# Patient Record
Sex: MALE | Race: WHITE | NOT HISPANIC OR LATINO | Employment: FULL TIME | URBAN - NONMETROPOLITAN AREA
[De-identification: names, ages, dates, MRNs, and addresses within clinical notes are randomized per-mention and may not be internally consistent; named-entity substitution may affect disease eponyms.]

---

## 2024-12-03 ENCOUNTER — OFFICE VISIT (OUTPATIENT)
Dept: URGENT CARE | Age: 24
End: 2024-12-03
Payer: COMMERCIAL

## 2024-12-03 VITALS
WEIGHT: 280 LBS | SYSTOLIC BLOOD PRESSURE: 142 MMHG | DIASTOLIC BLOOD PRESSURE: 89 MMHG | TEMPERATURE: 100.3 F | OXYGEN SATURATION: 96 % | BODY MASS INDEX: 34.82 KG/M2 | HEART RATE: 93 BPM | RESPIRATION RATE: 12 BRPM | HEIGHT: 75 IN

## 2024-12-03 DIAGNOSIS — J06.9 ACUTE UPPER RESPIRATORY INFECTION: Primary | ICD-10-CM

## 2024-12-03 DIAGNOSIS — R05.9 COUGH, UNSPECIFIED TYPE: ICD-10-CM

## 2024-12-03 DIAGNOSIS — R50.9 FEVER, UNSPECIFIED FEVER CAUSE: ICD-10-CM

## 2024-12-03 LAB
POC RAPID INFLUENZA A: NEGATIVE
POC RAPID INFLUENZA B: NEGATIVE
POC SARS-COV-2 AG BINAX: NORMAL

## 2024-12-03 PROCEDURE — 87634 RSV DNA/RNA AMP PROBE: CPT

## 2024-12-03 ASSESSMENT — ENCOUNTER SYMPTOMS
DIARRHEA: 0
DIZZINESS: 0
HEADACHES: 1
SINUS PRESSURE: 0
CHEST TIGHTNESS: 0
SEIZURES: 0
FATIGUE: 1
EYE ITCHING: 0
ABDOMINAL PAIN: 0
FLANK PAIN: 0
SHORTNESS OF BREATH: 0
EYE DISCHARGE: 0
VOMITING: 0
COUGH: 1
EYE PAIN: 0
FEVER: 1
SORE THROAT: 1
CHILLS: 0

## 2024-12-03 ASSESSMENT — VISUAL ACUITY: OU: 1

## 2024-12-03 NOTE — PATIENT INSTRUCTIONS
Discharge instructions    Please follow up with your Primary Care Physician within the next 5-7 days.    It is important to take prescriptions as prescribed and complete all antibiotics.     If your symptoms worsen you are instructed to immediately go to the emergency room for reevaluation and further assessment.    If you develop any chest pain, SOB, or difficulty breathing you are instructed to go to the emergency room for reevaluation.    All discharge instructions will be provided and explained to the patient at discharge.    If you have any questions regarding your treatment plan please call the [facility] urgent care clinic.

## 2024-12-03 NOTE — LETTER
December 3, 2024     Patient: Josue Moon   YOB: 2000   Date of Visit: 12/3/2024       To Whom It May Concern:    Josue Moon was seen in my clinic on 12/3/2024. Please excuse Josue for his absence from work on this day to make the appointment. He may return on 12/4/24.    If you have any questions or concerns, please don't hesitate to call.         Sincerely,         [facility]

## 2024-12-03 NOTE — PROGRESS NOTES
Subjective   Patient ID: Josue Moon is a 24 y.o. male. They present today with a chief complaint of Fever, Other (More tired than usual), Cough, and Headache (Symptoms for 4 days).    History of Present Illness  Patient is a 24-year-old male presenting to the clinic with complaints of head cold.  Patient states he has had intermittent fever, fatigue, cough, headache, sore throat, nasal congestion for the last 3 to 4 days.  Patient states that his daughter was recently diagnosed in the emergency room with RSV.  Patient states his symptoms started immediately after.  Patient not admitting to any chest pain or shortness of breath.  Patient not admitting to any abdominal pain, vomiting or diarrhea.  Patient states sore throat worse to swallow.  Mild in severity.  Cough nonproductive.  Patient would like evaluation and testing for RSV.  Patient also requesting a work note because he had to take yesterday and today off.  Patient states he gets pretty significant relief of symptoms with over-the-counter cold and flu medicine.      History provided by:  Patient  Fever   Associated symptoms include congestion, coughing, headaches and a sore throat. Pertinent negatives include no abdominal pain, chest pain, diarrhea, ear pain or vomiting.   Cough  Associated symptoms include a fever, headaches, postnasal drip and a sore throat. Pertinent negatives include no chest pain, chills, ear pain or shortness of breath.   Headache  Associated symptoms: congestion, cough, drainage, fatigue, fever and sore throat    Associated symptoms: no abdominal pain, no diarrhea, no dizziness, no ear pain, no eye pain, no seizures, no sinus pressure and no vomiting        Past Medical History  Allergies as of 12/03/2024    (No Known Allergies)       (Not in a hospital admission)       History reviewed. No pertinent past medical history.    History reviewed. No pertinent surgical history.     reports that he has quit smoking. His smoking use  "included cigarettes. He uses smokeless tobacco. He reports that he does not currently use alcohol. He reports that he does not use drugs.    Review of Systems  Review of Systems   Constitutional:  Positive for fatigue and fever. Negative for chills.   HENT:  Positive for congestion, postnasal drip and sore throat. Negative for ear discharge, ear pain and sinus pressure.    Eyes:  Negative for pain, discharge and itching.   Respiratory:  Positive for cough. Negative for chest tightness and shortness of breath.    Cardiovascular:  Negative for chest pain.   Gastrointestinal:  Negative for abdominal pain, diarrhea and vomiting.   Genitourinary:  Negative for flank pain.   Neurological:  Positive for headaches. Negative for dizziness and seizures.                                  Objective    Vitals:    12/03/24 0858   BP: 142/89   BP Location: Left arm   Patient Position: Sitting   BP Cuff Size: Large adult   Pulse: 93   Resp: 12   Temp: (!) 37.9 °C (100.3 °F)   TempSrc: Oral   SpO2: 96%   Weight: 127 kg (280 lb)   Height: 1.905 m (6' 3\")     No LMP for male patient.    Physical Exam  Vitals reviewed.   Constitutional:       General: He is awake. He is not in acute distress.     Appearance: Normal appearance. He is well-developed. He is not ill-appearing or toxic-appearing.   HENT:      Head: Normocephalic and atraumatic.      Right Ear: Tympanic membrane, ear canal and external ear normal.      Left Ear: Tympanic membrane, ear canal and external ear normal.      Nose: Congestion present.      Mouth/Throat:      Mouth: Mucous membranes are moist.      Pharynx: Oropharynx is clear. No oropharyngeal exudate or posterior oropharyngeal erythema.   Eyes:      General: Vision grossly intact.   Cardiovascular:      Rate and Rhythm: Normal rate and regular rhythm.      Heart sounds: Normal heart sounds, S1 normal and S2 normal. No murmur heard.     No friction rub. No gallop.   Pulmonary:      Effort: Pulmonary effort is " normal. No respiratory distress.      Breath sounds: Normal breath sounds. No stridor. No wheezing, rhonchi or rales.   Skin:     General: Skin is warm.   Neurological:      General: No focal deficit present.      Mental Status: He is alert and oriented to person, place, and time. Mental status is at baseline.      Gait: Gait is intact.   Psychiatric:         Mood and Affect: Mood normal.         Behavior: Behavior normal. Behavior is cooperative.         Procedures    Point of Care Test & Imaging Results from this visit  Results for orders placed or performed in visit on 12/03/24   POCT Covid-19 Rapid Antigen   Result Value Ref Range    POC NICOLE-COV-2 AG  Presumptive negative test for SARS-CoV-2 (no antigen detected)     Presumptive negative test for SARS-CoV-2 (no antigen detected)   POCT Influenza A/B manually resulted   Result Value Ref Range    POC Rapid Influenza A Negative Negative    POC Rapid Influenza B Negative Negative      No results found.    Diagnostic study results (if any) were reviewed by [facility].    Assessment/Plan   Allergies, medications, history, and pertinent labs/EKGs/Imaging reviewed by Ed Steward PA-C.     Medical Decision Making  Patient is a 24-year-old male presenting to the clinic with complaints of head cold.  Patient states he has had intermittent fever, fatigue, cough, headache, sore throat, nasal congestion for the last 3 to 4 days.  Patient states that his daughter was recently diagnosed in the emergency room with RSV.  Patient states his symptoms started immediately after.  Patient not admitting to any chest pain or shortness of breath.  Patient not admitting to any abdominal pain, vomiting or diarrhea.  Patient states sore throat worse to swallow.  Mild in severity.  Cough nonproductive.  Patient would like evaluation and testing for RSV.  Patient also requesting a work note because he had to take yesterday and today off.  Vital signs in the clinic are stable.   Patient states that he gets improvement in his symptoms when he takes over-the-counter cold and flu medicine.  Patient states that when it wears off his symptoms return otherwise they are well-controlled with over-the-counter medication.  Likely acute upper respiratory infection.  Pending RSV PCR.  Continue over-the-counter medication use as needed. Discharge instructions. Please follow up with your Primary Care Physician within the next 5-7 days. It is important to take prescriptions as prescribed and complete all antibiotics. If your symptoms worsen you are instructed to immediately go to the emergency room for reevaluation and further assessment. If you develop any chest pain, SOB, or difficulty breathing you are instructed to go to the emergency room for reevaluation. All discharge instructions will be provided and explained to the patient at discharge. If you have any questions regarding your treatment plan please call the [facility] urgent care clinic.     Orders and Diagnoses  Diagnoses and all orders for this visit:  Cough, unspecified type  -     POCT Influenza A/B manually resulted  Fever, unspecified fever cause  -     POCT Covid-19 Rapid Antigen      Medical Admin Record      Patient disposition: Home    Electronically signed by [facility]  9:27 AM

## 2024-12-04 LAB — RSV RNA RESP QL NAA+PROBE: DETECTED

## 2024-12-11 ENCOUNTER — HOSPITAL ENCOUNTER (EMERGENCY)
Facility: HOSPITAL | Age: 24
Discharge: HOME | End: 2024-12-11
Payer: COMMERCIAL

## 2024-12-11 VITALS
TEMPERATURE: 97.5 F | RESPIRATION RATE: 18 BRPM | WEIGHT: 280 LBS | HEIGHT: 75 IN | DIASTOLIC BLOOD PRESSURE: 85 MMHG | BODY MASS INDEX: 34.82 KG/M2 | OXYGEN SATURATION: 98 % | SYSTOLIC BLOOD PRESSURE: 132 MMHG | HEART RATE: 76 BPM

## 2024-12-11 DIAGNOSIS — T70.0XXA OTITIC BAROTRAUMA, INITIAL ENCOUNTER: ICD-10-CM

## 2024-12-11 DIAGNOSIS — H60.501 ACUTE OTITIS EXTERNA OF RIGHT EAR, UNSPECIFIED TYPE: Primary | ICD-10-CM

## 2024-12-11 PROCEDURE — 99283 EMERGENCY DEPT VISIT LOW MDM: CPT | Performed by: PHYSICIAN ASSISTANT

## 2024-12-11 PROCEDURE — 99284 EMERGENCY DEPT VISIT MOD MDM: CPT

## 2024-12-11 RX ORDER — NEOMYCIN SULFATE, POLYMYXIN B SULFATE, BACITRACIN ZINC, HYDROCORTISONE 3.5; 10000; 400; 1 MG/G; [USP'U]/G; [USP'U]/G; MG/G
1 OINTMENT OPHTHALMIC 2 TIMES DAILY
Qty: 3.5 G | Refills: 0 | Status: SHIPPED | OUTPATIENT
Start: 2024-12-11 | End: 2024-12-11 | Stop reason: ENTERED-IN-ERROR

## 2024-12-11 RX ORDER — NEOMYCIN SULFATE, POLYMYXIN B SULFATE AND HYDROCORTISONE 10; 3.5; 1 MG/ML; MG/ML; [USP'U]/ML
4 SUSPENSION/ DROPS AURICULAR (OTIC) 3 TIMES DAILY
Qty: 10 ML | Refills: 0 | Status: SHIPPED | OUTPATIENT
Start: 2024-12-11 | End: 2024-12-21

## 2024-12-11 ASSESSMENT — COLUMBIA-SUICIDE SEVERITY RATING SCALE - C-SSRS
2. HAVE YOU ACTUALLY HAD ANY THOUGHTS OF KILLING YOURSELF?: NO
1. IN THE PAST MONTH, HAVE YOU WISHED YOU WERE DEAD OR WISHED YOU COULD GO TO SLEEP AND NOT WAKE UP?: NO
6. HAVE YOU EVER DONE ANYTHING, STARTED TO DO ANYTHING, OR PREPARED TO DO ANYTHING TO END YOUR LIFE?: NO

## 2024-12-11 ASSESSMENT — PAIN - FUNCTIONAL ASSESSMENT: PAIN_FUNCTIONAL_ASSESSMENT: 0-10

## 2024-12-11 ASSESSMENT — PAIN DESCRIPTION - LOCATION: LOCATION: EAR

## 2024-12-11 ASSESSMENT — PAIN DESCRIPTION - ORIENTATION: ORIENTATION: RIGHT

## 2024-12-11 ASSESSMENT — PAIN DESCRIPTION - PAIN TYPE: TYPE: ACUTE PAIN

## 2024-12-11 ASSESSMENT — PAIN SCALES - GENERAL: PAINLEVEL_OUTOF10: 8

## 2024-12-12 NOTE — ED PROVIDER NOTES
"HPI   Chief Complaint   Patient presents with   • Earache       24-year-old male here with complaints of right ear pain patient states symptoms started earlier today he did try swimmers eardrops and states he had instantaneous burning sensation to the ear and worsening pain    Denies any cough congestion fevers or chills  Does have a history of \"earwax problems in the past\"                Patient History   History reviewed. No pertinent past medical history.  History reviewed. No pertinent surgical history.  No family history on file.  Social History     Tobacco Use   • Smoking status: Former     Types: Cigarettes   • Smokeless tobacco: Current   Substance Use Topics   • Alcohol use: Not Currently   • Drug use: Never       Physical Exam   ED Triage Vitals [12/11/24 1934]   Temperature Heart Rate Respirations BP   36.4 °C (97.5 °F) 82 18 137/87      Pulse Ox Temp Source Heart Rate Source Patient Position   97 % Temporal Monitor --      BP Location FiO2 (%)     -- --       Physical Exam  Vitals and nursing note reviewed.   Constitutional:       Appearance: Normal appearance.   HENT:      Head: Normocephalic and atraumatic.      Right Ear: Tympanic membrane normal.      Left Ear: Tympanic membrane, ear canal and external ear normal.      Ears:      Comments: Right ear canal slight erythema with swelling  Slight amount of earwax but the tympanic membrane is not occluded  No active drainage or discharge     Nose: Nose normal.      Mouth/Throat:      Mouth: Mucous membranes are moist.      Pharynx: Oropharynx is clear.   Cardiovascular:      Rate and Rhythm: Normal rate and regular rhythm.   Pulmonary:      Effort: Pulmonary effort is normal.      Breath sounds: Normal breath sounds.   Abdominal:      Palpations: Abdomen is soft.   Musculoskeletal:         General: Normal range of motion.      Cervical back: Normal range of motion and neck supple.   Skin:     General: Skin is warm.      Capillary Refill: Capillary refill " takes less than 2 seconds.   Neurological:      General: No focal deficit present.      Mental Status: He is alert.           ED Course & MDM   Diagnoses as of 12/11/24 2010   Acute otitis externa of right ear, unspecified type   Otitic barotrauma, initial encounter                 No data recorded     Sandra Coma Scale Score: 15 (12/11/24 1938 : Bernadine Kay RN)                           Medical Decision Making  Differential:   1) right otitis externa   2) right otitis media     24-year-old here with complaints of right ear pain started earlier today tried swimmers eardrops over-the-counter and had increased burning and discomfort on exam there was slight cerumen noted in the ear canal but the tympanic membrane was not occluded no erythema or bulging noted no foreign body noted, will treat as an otitis externa but also given medicine to help if there is a little barotitis with it stressed to the patient to take the medication as prescribed he can also take over-the-counter Motrin or Tylenol in addition if needed   Plan: Discussed differential with the patient and /or parents family   Patient to  follow up with the PCP in the next 2-3 days  Return for any worsening symptoms or go to the ER for further evaluation. Patient/family/caregiver  understands return   precautions and discharge instructions and is agreeable to the current plan   Impression:right otitis externia'          Orders and Diagnoses for this visit        Procedure  Procedures     Betsy Perrin PA-C  12/11/24 2010

## 2024-12-12 NOTE — ED TRIAGE NOTES
Pt arrives ambulatory to [facility], presenting with a right sided earache that began yesterday. Pt states he is now having issues hearing and it is causing mild headaches. Pt denies any CP, SOB, N/V/D, fever and/or chills. Pt A&Ox3, resp easy and unlabored, skin of appropriate color.

## 2024-12-14 ENCOUNTER — OFFICE VISIT (OUTPATIENT)
Dept: URGENT CARE | Age: 24
End: 2024-12-14
Payer: COMMERCIAL

## 2024-12-14 VITALS
TEMPERATURE: 100 F | BODY MASS INDEX: 36.95 KG/M2 | RESPIRATION RATE: 20 BRPM | DIASTOLIC BLOOD PRESSURE: 88 MMHG | HEART RATE: 100 BPM | HEIGHT: 75 IN | OXYGEN SATURATION: 96 % | SYSTOLIC BLOOD PRESSURE: 138 MMHG | WEIGHT: 297.18 LBS

## 2024-12-14 DIAGNOSIS — H69.93 EUSTACHIAN TUBE DYSFUNCTION, BILATERAL: ICD-10-CM

## 2024-12-14 DIAGNOSIS — H66.93 ACUTE OTITIS MEDIA, BILATERAL: ICD-10-CM

## 2024-12-14 DIAGNOSIS — H61.21 IMPACTED CERUMEN OF RIGHT EAR: Primary | ICD-10-CM

## 2024-12-14 DIAGNOSIS — H60.8X3 OTHER OTITIS EXTERNA, BILATERAL: ICD-10-CM

## 2024-12-14 RX ORDER — OFLOXACIN 3 MG/ML
10 SOLUTION AURICULAR (OTIC) DAILY
Qty: 10 ML | Refills: 0 | Status: SHIPPED | OUTPATIENT
Start: 2024-12-14 | End: 2024-12-24

## 2024-12-14 RX ORDER — AMOXICILLIN 875 MG/1
875 TABLET, FILM COATED ORAL 2 TIMES DAILY
Qty: 20 TABLET | Refills: 0 | Status: SHIPPED | OUTPATIENT
Start: 2024-12-14 | End: 2024-12-24

## 2024-12-14 RX ORDER — FLUTICASONE PROPIONATE 50 MCG
1 SPRAY, SUSPENSION (ML) NASAL DAILY
Qty: 16 G | Refills: 0 | Status: SHIPPED | OUTPATIENT
Start: 2024-12-14 | End: 2024-12-28

## 2024-12-14 NOTE — PROGRESS NOTES
"Subjective   Patient ID: Josue Moon is a 24 y.o. male. They present today with a chief complaint of Earache (BILATERAL EAR PAIN, worsening//ER visit on 12/11, states RIGHT ear was inflamed. Prescribed neomycin.  ) and Fever.    History of Present Illness  24-year-old male presents urgent care for complaint of bilateral ear pain worsening since his ER visit on 12/11/2024.  States he was told he had external ear infection and was prescribed neomycin drops.  States he also has fever.  Denies any current nausea or vomiting, chills, sweats, chest pain, shortness of breath, abdominal pain, hearing changes.  Did have cerumen impaction right ear improves some of the pain but still has pain.  Bilateral TMs are erythematous, bulging with fluid.  Ear canals mildly erythematous as well as mildly edematous with very minimal amount of mucopurulent drainage.  No tenderness to the external ear.  Bilateral mastoid processes unremarkable.  Denies any known drug allergies.  Instructed to discontinue neomycin drops.  Prescribed ofloxacin, Flonase, and amoxicillin.  Placed ENT referral to call today to schedule next available appointment.  Return/ER precautions.  Patient agrees with plan.          Past Medical History  Allergies as of 12/14/2024    (No Known Allergies)       (Not in a hospital admission)       No past medical history on file.    No past surgical history on file.     reports that he has quit smoking. His smoking use included cigarettes. He uses smokeless tobacco. He reports that he does not currently use alcohol. He reports that he does not use drugs.    Review of Systems  Review of Systems   All other systems reviewed and are negative.                                 Objective    Vitals:    12/14/24 0904   BP: 138/88   Pulse: (!) 117   Resp: 20   Temp: 37.8 °C (100 °F)   TempSrc: Oral   SpO2: 96%   Weight: 135 kg (297 lb 2.9 oz)   Height: 1.905 m (6' 3\")     No LMP for male patient.    Physical Exam  Vitals " reviewed.   Constitutional:       General: He is not in acute distress.     Appearance: Normal appearance. He is not ill-appearing, toxic-appearing or diaphoretic.   HENT:      Head: Normocephalic and atraumatic.      Ears:      Comments: Right ear canal had cerumen impaction.  After cerumen removal the ear canal is mildly erythematous/edematous/very minimal amount of mucopurulent discharge.  Left ear canal also is mildly erythematous/edematous/very minimal amount of mucopurulent discharge.  Bilateral TMs are erythematous, bulging with fluid.  Bilateral mastoid processes unremarkable.  External ear appears normal and is nontender to palpation.  There is some tenderness with traction to the tragus.     Nose: Nose normal.      Mouth/Throat:      Mouth: Mucous membranes are moist.      Pharynx: Oropharynx is clear.   Cardiovascular:      Rate and Rhythm: Normal rate and regular rhythm.   Pulmonary:      Effort: Pulmonary effort is normal.      Breath sounds: Normal breath sounds.   Abdominal:      General: Abdomen is flat.      Palpations: Abdomen is soft.      Tenderness: There is no abdominal tenderness. There is no right CVA tenderness or left CVA tenderness.   Musculoskeletal:      Cervical back: Normal range of motion and neck supple. No rigidity or tenderness.   Lymphadenopathy:      Cervical: No cervical adenopathy.   Skin:     General: Skin is warm and dry.   Neurological:      General: No focal deficit present.      Mental Status: He is alert and oriented to person, place, and time.   Psychiatric:         Mood and Affect: Mood normal.         Behavior: Behavior normal.         Procedures    Point of Care Test & Imaging Results from this visit  No results found for this visit on 12/14/24.   No results found.    Diagnostic study results (if any) were reviewed by Mary Boogie PA-C.    Assessment/Plan   Allergies, medications, history, and pertinent labs/EKGs/Imaging reviewed by Mary Boogie PA-C.      Medical Decision Making  24-year-old male presents urgent care for complaint of bilateral ear pain worsening since his ER visit on 12/11/2024.  States he was told he had external ear infection and was prescribed neomycin drops.  States he also has fever.  Denies any current nausea or vomiting, chills, sweats, chest pain, shortness of breath, abdominal pain, hearing changes.  Did have cerumen impaction right ear improves some of the pain but still has pain.  Bilateral TMs are erythematous, bulging with fluid.  Ear canals mildly erythematous as well as mildly edematous with very minimal amount of mucopurulent drainage.  No tenderness to the external ear.  Bilateral mastoid processes unremarkable.  Denies any known drug allergies.  Instructed to discontinue neomycin drops.  Prescribed ofloxacin, Flonase, and amoxicillin.  Placed ENT referral to call today to schedule next available appointment.  Return/ER precautions.  Patient agrees with plan.    Orders and Diagnoses  There are no diagnoses linked to this encounter.    Medical Admin Record      Patient disposition: Home    Electronically signed by Mary Boogie PA-C  9:19 AM

## 2024-12-14 NOTE — PATIENT INSTRUCTIONS
Please take medications as prescribed.  Discontinue neomycin drops.  Maintain adequate hydration and nutrition.  Call referral number today to schedule next available ENT appointment for follow-up.  If symptoms worsen, do not improve, or you develop any new concerning or worrisome symptoms please go to the emergency department immediately.

## 2025-02-04 ENCOUNTER — APPOINTMENT (OUTPATIENT)
Dept: AUDIOLOGY | Facility: CLINIC | Age: 25
End: 2025-02-04
Payer: COMMERCIAL

## 2025-02-07 ENCOUNTER — APPOINTMENT (OUTPATIENT)
Dept: OTOLARYNGOLOGY | Facility: CLINIC | Age: 25
End: 2025-02-07
Payer: COMMERCIAL

## 2025-02-07 DIAGNOSIS — H61.21 IMPACTED CERUMEN OF RIGHT EAR: ICD-10-CM

## 2025-02-07 DIAGNOSIS — H69.93 EUSTACHIAN TUBE DYSFUNCTION, BILATERAL: ICD-10-CM

## 2025-02-07 DIAGNOSIS — H66.93 ACUTE OTITIS MEDIA, BILATERAL: ICD-10-CM

## 2025-02-07 DIAGNOSIS — H91.93 DECREASED HEARING OF BOTH EARS: Primary | ICD-10-CM

## 2025-02-07 DIAGNOSIS — H60.8X3 OTHER OTITIS EXTERNA, BILATERAL: ICD-10-CM

## 2025-02-07 PROCEDURE — 99203 OFFICE O/P NEW LOW 30 MIN: CPT | Performed by: OTOLARYNGOLOGY

## 2025-02-07 NOTE — PROGRESS NOTES
"History Of Present Illness  Josue Moon is a 24 y.o. male presenting with: \"Double ear infection in December 2024, despite 2 months he still has symptoms\". Difficulty with hearing, ear fullness and intermittent popping in ears.    He is kindly referred by Mary Boogie PA-C    On examination, TMs look intact bilaterally. I have not seen infection, effusion or retraction. There is increased vascularity of left malleus.    Nasal passages look open bilaterally  No visible postnasal discharge.  Tonsils look hypertrophic bilaterally.  No palpable neck mass  512 cps verduzco (right), rinne +/+ bilaterally.    Plan:  1- hearing test, follow up after the test     Past Medical History  He has no past medical history on file.    Surgical History  He has no past surgical history on file.     Social History  He reports that he has quit smoking. His smoking use included cigarettes. He uses smokeless tobacco. He reports that he does not currently use alcohol. He reports that he does not use drugs.    Family History  No family history on file.     Allergies  Patient has no known allergies.    Review of Systems   Difficulty hearing  Ears feel full     Physical Exam    General appearance: Healthy-appearing, well-nourished, well groomed, in no acute distress.     Head and Face: Atraumatic with no masses, lesions, or scarring.      Salivary glands: No tenderness of the parotid glands or parotid masses.     No tenderness of the submandibular glands or submandibular masses.      Facial strength: Normal strength and symmetry, no synkinesis or facial tic.     Eyes: Conjunctivas look non-hyperemic bilaterally    Ears: Bilaterally ear canals look normal. Tympanic membranes look intact, no hyperemia, fluid or retraction. Hearing grossly normal. Mild old effusion scar (+) at left TM.    Nose: Mucosa looks normal. Focal dryness over nasal septum mucosa anteroinferiorly on both sides. Septum essentially straight.     Oral " "Cavity/Mouth: Lips and tongue look normal.     Throat: No postnasal discharge. No tonsil hypertrophy. No hyperemia.    Neck: Symmetrical, trachea midline.     Pulmonary: Normal respiratory effort.     Lymphatic: No palpable pathologic lymph nodes at neck.     Neurological/Psychiatric Orientation to person, place, and time: Normal.     Mood and affect: Normal.      Extremities: No clubbing.     Skin: No significant skin lesions were noted at face or neck        Procedure         Last Recorded Vitals  There were no vitals taken for this visit.    Relevant Results    Assessment and Plan:  Josue Moon is a 24 y.o. male presenting with: \"Double ear infection in December 2024, despite 2 months he still has symptoms\". Difficulty with hearing, ear fullness and intermittent popping in ears.    He is kindly referred by Mary Boogie PA-C    On examination, TMs look intact bilaterally. I have not seen infection, effusion or retraction. There is increased vascularity of left malleus.    Nasal passages look open bilaterally  No visible postnasal discharge.  Tonsils look hypertrophic bilaterally.  No palpable neck mass  512 cps verduzco (right), rinne +/+ bilaterally.    My initial impression, patient may have residual eustachian tube dysfunction causing fulness in ears, popping and decrease in hearing.    Plan:  1- hearing test, follow up after the test    Ayana Still  Otolaryngology - Head & Neck Surgery  "

## 2025-02-07 NOTE — LETTER
"February 7, 2025     Mary Boogie PA-C  [address] [facility] [address]    Patient: Josue Moon   YOB: 2000   Date of Visit: 2/7/2025       Dear Dr. Mary Boogie PA-C:    Thank you for referring Josue Moon to me for evaluation. Below are my notes for this consultation.  If you have questions, please do not hesitate to call me. I look forward to following your patient along with you.       Sincerely,     Ayana Still MD      CC: No Recipients  ______________________________________________________________________________________    History Of Present Illness  oJsue Moon is a 24 y.o. male presenting with: \"Double ear infection in December 2024, despite 2 months he still has symptoms\". Difficulty with hearing, ear fullness and intermittent popping in ears.    He is kindly referred by Mary Boogie PA-C    On examination, TMs look intact bilaterally. I have not seen infection, effusion or retraction. There is increased vascularity of left malleus.    Nasal passages look open bilaterally  No visible postnasal discharge.  Tonsils look hypertrophic bilaterally.  No palpable neck mass  512 cps verduzco (right), rinne +/+ bilaterally.    Plan:  1- hearing test, follow up after the test     Past Medical History  He has no past medical history on file.    Surgical History  He has no past surgical history on file.     Social History  He reports that he has quit smoking. His smoking use included cigarettes. He uses smokeless tobacco. He reports that he does not currently use alcohol. He reports that he does not use drugs.    Family History  No family history on file.     Allergies  Patient has no known allergies.    Review of Systems   Difficulty hearing  Ears feel full     Physical Exam    General appearance: Healthy-appearing, well-nourished, well groomed, in no acute distress.     Head and Face: Atraumatic with no masses, lesions, or scarring.      Salivary glands: No " "tenderness of the parotid glands or parotid masses.     No tenderness of the submandibular glands or submandibular masses.      Facial strength: Normal strength and symmetry, no synkinesis or facial tic.     Eyes: Conjunctivas look non-hyperemic bilaterally    Ears: Bilaterally ear canals look normal. Tympanic membranes look intact, no hyperemia, fluid or retraction. Hearing grossly normal. Mild old effusion scar (+) at left TM.    Nose: Mucosa looks normal. Focal dryness over nasal septum mucosa anteroinferiorly on both sides. Septum essentially straight.     Oral Cavity/Mouth: Lips and tongue look normal.     Throat: No postnasal discharge. No tonsil hypertrophy. No hyperemia.    Neck: Symmetrical, trachea midline.     Pulmonary: Normal respiratory effort.     Lymphatic: No palpable pathologic lymph nodes at neck.     Neurological/Psychiatric Orientation to person, place, and time: Normal.     Mood and affect: Normal.      Extremities: No clubbing.     Skin: No significant skin lesions were noted at face or neck        Procedure         Last Recorded Vitals  There were no vitals taken for this visit.    Relevant Results    Assessment and Plan:  Josue Moon is a 24 y.o. male presenting with: \"Double ear infection in December 2024, despite 2 months he still has symptoms\". Difficulty with hearing, ear fullness and intermittent popping in ears.    He is kindly referred by Mary Boogie PA-C    On examination, TMs look intact bilaterally. I have not seen infection, effusion or retraction. There is increased vascularity of left malleus.    Nasal passages look open bilaterally  No visible postnasal discharge.  Tonsils look hypertrophic bilaterally.  No palpable neck mass  512 cps verduzco (right), rinne +/+ bilaterally.    My initial impression, patient may have residual eustachian tube dysfunction causing fulness in ears, popping and decrease in hearing.    Plan:  1- hearing test, follow up after the " test    Ayana Still  Otolaryngology - Head & Neck Surgery

## 2025-02-26 ENCOUNTER — APPOINTMENT (OUTPATIENT)
Dept: PRIMARY CARE | Facility: CLINIC | Age: 25
End: 2025-02-26
Payer: COMMERCIAL

## 2025-02-26 VITALS
TEMPERATURE: 98 F | HEART RATE: 79 BPM | SYSTOLIC BLOOD PRESSURE: 128 MMHG | WEIGHT: 308.6 LBS | BODY MASS INDEX: 38.37 KG/M2 | HEIGHT: 75 IN | OXYGEN SATURATION: 98 % | DIASTOLIC BLOOD PRESSURE: 78 MMHG

## 2025-02-26 DIAGNOSIS — E78.5 BORDERLINE HYPERLIPIDEMIA: Primary | ICD-10-CM

## 2025-02-26 DIAGNOSIS — H92.02 ACUTE OTALGIA, LEFT: ICD-10-CM

## 2025-02-26 PROBLEM — H61.20 IMPACTED CERUMEN: Status: ACTIVE | Noted: 2025-02-26

## 2025-02-26 PROBLEM — J30.1 CHRONIC SEASONAL ALLERGIC RHINITIS DUE TO POLLEN: Status: ACTIVE | Noted: 2017-08-02

## 2025-02-26 PROBLEM — M24.811 INTERNAL DERANGEMENT OF RIGHT SHOULDER: Status: ACTIVE | Noted: 2019-06-19

## 2025-02-26 PROCEDURE — 3008F BODY MASS INDEX DOCD: CPT | Performed by: PHYSICIAN ASSISTANT

## 2025-02-26 PROCEDURE — 99203 OFFICE O/P NEW LOW 30 MIN: CPT | Performed by: PHYSICIAN ASSISTANT

## 2025-02-26 ASSESSMENT — PATIENT HEALTH QUESTIONNAIRE - PHQ9
1. LITTLE INTEREST OR PLEASURE IN DOING THINGS: NOT AT ALL
SUM OF ALL RESPONSES TO PHQ9 QUESTIONS 1 AND 2: 0
2. FEELING DOWN, DEPRESSED OR HOPELESS: NOT AT ALL

## 2025-02-26 NOTE — PROGRESS NOTES
"Subjective     HPI   Josue Moon is a 24 y.o. year old male patient with presenting to clinic with concern for   Chief Complaint   Patient presents with    New Patient Visit       Josue presents to clinic to establish care as a new patient. Recurrent earaches.    BP Readings from Last 5 Encounters:   25 128/78   24 138/88   24 132/85   24 142/89       Ear pain, scarring- hx recurrent AOM as a child  Dr Still ENT      Patient Active Problem List   Diagnosis    Chronic seasonal allergic rhinitis due to pollen    Impacted cerumen    Internal derangement of right shoulder       Past Medical History:   Diagnosis Date    Ear problems Ear Infection 12/10/24      Past Surgical History:   Procedure Laterality Date    NO PAST SURGERIES        Family History   Problem Relation Name Age of Onset    Sudden death Paternal Grandfather           in sleep ~ age 50s      Social History     Tobacco Use    Smoking status: Former     Current packs/day: 0.00     Average packs/day: 0.5 packs/day for 1 year (0.5 ttl pk-yrs)     Types: Cigarettes     Start date:      Quit date:      Years since quittin.1    Smokeless tobacco: Current   Substance Use Topics    Alcohol use: Not Currently      No current outpatient medications on file.     Review of Systems  Constitutional: Denies fever  HEENT: Denies ST, earache  CVS: Denies Chest pain  Pulmonary: Denies wheezing, SOB  GI: Denies N/V  : Denies dysuria  Musculoskeletal:  Denies myalgia  Neuro: Denies focal weakness or numbness.  Skin: Denies Rashes.  *Review of Systems is negative unless otherwise mentioned in HPI or ROS above.    Objective   /78   Pulse 79   Temp 36.7 °C (98 °F)   Ht 1.905 m (6' 3\")   Wt 140 kg (308 lb 9.6 oz)   SpO2 98%   BMI 38.57 kg/m²  reviewed Body mass index is 38.57 kg/m².     Physical Exam  Constitutional: NAD.  Resting comfortably.  Head: Atraumatic, normocephalic.  ENT: Moist oral mucosa. Nasal mucosa " wnl.   Cardiac: Regular rate & rhythm.   Pulmonary: Lungs clear bilat  GI: Soft, Nontender, nondistended.   Musculoskeletal: No peripheral edema.   Skin: No evidence of trauma. No rashes  Psych: Intact judgement and insight.    .Assessment/Plan   Problem List Items Addressed This Visit    None  Visit Diagnoses         Codes    Borderline hyperlipidemia    -  Primary E78.5    Relevant Orders    CBC    Comprehensive Metabolic Panel    TSH with reflex to Free T4 if abnormal    Lipid Panel

## 2025-03-18 ENCOUNTER — APPOINTMENT (OUTPATIENT)
Dept: OTOLARYNGOLOGY | Facility: CLINIC | Age: 25
End: 2025-03-18
Payer: COMMERCIAL

## 2025-03-18 ENCOUNTER — APPOINTMENT (OUTPATIENT)
Dept: AUDIOLOGY | Facility: CLINIC | Age: 25
End: 2025-03-18
Payer: COMMERCIAL

## 2025-03-18 DIAGNOSIS — H69.92 DISORDER OF LEFT EUSTACHIAN TUBE: Primary | ICD-10-CM

## 2025-03-18 DIAGNOSIS — H90.72 MIXED CONDUCTIVE AND SENSORINEURAL HEARING LOSS OF LEFT EAR WITH UNRESTRICTED HEARING OF RIGHT EAR: ICD-10-CM

## 2025-03-18 DIAGNOSIS — H74.8X2 STIFF MIDDLE EAR TRANSMISSION OF LEFT EAR, TYPE A-S: Primary | ICD-10-CM

## 2025-03-18 DIAGNOSIS — H93.13 SUBJECTIVE TINNITUS OF BOTH EARS: ICD-10-CM

## 2025-03-18 PROCEDURE — 92557 COMPREHENSIVE HEARING TEST: CPT | Performed by: AUDIOLOGIST

## 2025-03-18 PROCEDURE — 99213 OFFICE O/P EST LOW 20 MIN: CPT | Performed by: OTOLARYNGOLOGY

## 2025-03-18 PROCEDURE — 92550 TYMPANOMETRY & REFLEX THRESH: CPT | Performed by: AUDIOLOGIST

## 2025-03-18 RX ORDER — PREDNISONE 10 MG/1
TABLET ORAL
Qty: 34 TABLET | Refills: 0 | Status: SHIPPED | OUTPATIENT
Start: 2025-03-18

## 2025-03-18 ASSESSMENT — PAIN SCALES - GENERAL: PAINLEVEL_OUTOF10: 0 - NO PAIN

## 2025-03-18 ASSESSMENT — PAIN - FUNCTIONAL ASSESSMENT: PAIN_FUNCTIONAL_ASSESSMENT: 0-10

## 2025-03-18 NOTE — PROGRESS NOTES
Josue Moon, age 24 years, is here today for a hearing evaluation. He reports a double ear infection several months ago and the left has not resolved.    Difficulty hearing - yes, left ear for the past several months  Tinnitus - yes, occasionally in both ears  Excessive noise exposure - no  Chronic ear infections - yes  Ear pain - no, pressure in the left ear  Ear drainage - no  Past ear surgery - no  Vertigo - no  Dizziness - no  Past hearing aid use - no  Family history - no    Appointment time: 9-9:50    Otoscopy revealed clear ear canals with visual inspection of the tympanic membranes bilaterally.    Behavioral hearing evaluation:  Right ear - normal hearing sensitivity to borderline normal hearing sensitivity 125-8000 Hz  Left ear - normal hearing sensitivity to borderline normal hearing sensitivity 125-6000 Hz decreasing to mild at 8000 Hz    Speech reception thresholds obtained at a level consistent with pure tone thresholds bilaterally.    Word discrimination:  Right ear - excellent (100%)  Left ear - excellent (100%)    Tympanometry:  Right ear - Type A, normal middle ear function  Left ear - Type As, eardrum hypomobility with normal ear canal volume and normal middle ear pressure    Ipsilateral acoustic reflexes:  Probe right - present 500-4000 Hz  Probe left - present 500-4000 Hz    Contralateral acoustic reflexes:  Probe right - present 500-4000 Hz  Probe left - present 500-4000 Hz  The presence of acoustic reflexes within normal intensity limits is consistent with normal middle ear and brainstem function, and suggests that auditory sensitivity is not significantly impaired.     Distortion Product Otoacoustic Emissions (DPOAEs):  Right ear - present 7453-0293 Hz   Left ear - present 8343-7515 Hz   Present DPOAEs are consistent with normal cochlear outer hair cell function at those frequencies    Recommendations:  1) Follow up with Dr Still regarding Type As tympanogram and symptom of pressure in the  left ear  2) Re-evaluate hearing annually, to monitor, or sooner if a change in hearing is noted

## 2025-03-18 NOTE — PROGRESS NOTES
"History Of Present Illness    2025. He still feels fullness in left ear only, right ear has cleared. No more popping.   Hearing test shows hearing within normal limits. Type A tympanogram at right and type As tympanogram at left ear. Tinnitus off and on, could be on both sides. Dizziness (-).    On examination, bilaterally tympanic membrane's look intact.  Left tympanic membrane has bright effusion scar posterior inferiorly.    My impression, patient has dysfunction of left eustachian tube.    Plan  1-prednisone for 10 days  2-follow-up in 2 weeks  ______________________________________________________________________    2025: Josue Moon is a 24 y.o. male presenting with: \"Double ear infection in 2024, despite 2 months he still has symptoms\". Difficulty with hearing, ear fullness and intermittent popping in ears.    He is kindly referred by Mary Boogie PA-C    On examination, TMs look intact bilaterally. I have not seen infection, effusion or retraction. There is increased vascularity of left malleus.    Nasal passages look open bilaterally  No visible postnasal discharge.  Tonsils look hypertrophic bilaterally.  No palpable neck mass  512 cps verduzco (right), rinne +/+ bilaterally.    Plan:  1- hearing test, follow up after the test     Past Medical History  He has a past medical history of Ear problems (Ear Infection 12/10/24).    Surgical History  He has a past surgical history that includes No past surgeries.     Social History  He reports that he quit smoking about 6 years ago. His smoking use included cigarettes. He started smoking about 7 years ago. He has a 0.5 pack-year smoking history. He uses smokeless tobacco. He reports that he does not currently use alcohol. He reports that he does not use drugs.    Family History  Family History   Problem Relation Name Age of Onset    Sudden death Paternal Grandfather           in sleep ~ age 50s        Allergies  Patient has no " known allergies.    Review of Systems (initial ROS)  Difficulty hearing  Ears feel full     Physical Exam (initial exam note)    General appearance: Healthy-appearing, well-nourished, well groomed, in no acute distress.     Head and Face: Atraumatic with no masses, lesions, or scarring.      Salivary glands: No tenderness of the parotid glands or parotid masses.     No tenderness of the submandibular glands or submandibular masses.      Facial strength: Normal strength and symmetry, no synkinesis or facial tic.     Eyes: Conjunctivas look non-hyperemic bilaterally    Ears: Bilaterally ear canals look normal. Tympanic membranes look intact, no hyperemia, fluid or retraction. Hearing grossly normal. Mild old effusion scar (+) at left TM.    Nose: Mucosa looks normal. Focal dryness over nasal septum mucosa anteroinferiorly on both sides. Septum essentially straight.     Oral Cavity/Mouth: Lips and tongue look normal.     Throat: No postnasal discharge. No tonsil hypertrophy. No hyperemia.    Neck: Symmetrical, trachea midline.     Pulmonary: Normal respiratory effort.     Lymphatic: No palpable pathologic lymph nodes at neck.     Neurological/Psychiatric Orientation to person, place, and time: Normal.     Mood and affect: Normal.      Extremities: No clubbing.     Skin: No significant skin lesions were noted at face or neck        Procedure         Last Recorded Vitals  There were no vitals taken for this visit.    Relevant Results    Assessment and Plan:    03.18.2025. He still feels fullness in left ear only, right ear has cleared. No more popping.   Hearing test shows hearing within normal limits. Type A tympanogram at right and type As tympanogram at left ear. Tinnitus off and on, could be on both sides. Dizziness (-).    On examination, bilaterally tympanic membrane's look intact.  Left tympanic membrane has bright effusion scar posterior inferiorly.    My impression, patient has dysfunction of left eustachian  "tube.    Plan  1-prednisone for 10 days  2-follow-up in 2 weeks  ______________________________________________________________________    Josue Moon is a 24 y.o. male presenting with: \"Double ear infection in December 2024, despite 2 months he still has symptoms\". Difficulty with hearing, ear fullness and intermittent popping in ears.    He is kindly referred by Mary Boogie PA-C    On examination, TMs look intact bilaterally. I have not seen infection, effusion or retraction. There is increased vascularity of left malleus.    Nasal passages look open bilaterally  No visible postnasal discharge.  Tonsils look hypertrophic bilaterally.  No palpable neck mass  512 cps verduzco (right), rinne +/+ bilaterally.    My initial impression, patient may have residual eustachian tube dysfunction causing fulness in ears, popping and decrease in hearing.    Plan:  1- hearing test, follow up after the test    Ayana Still  Otolaryngology - Head & Neck Surgery  "

## 2025-04-01 ENCOUNTER — APPOINTMENT (OUTPATIENT)
Dept: OTOLARYNGOLOGY | Facility: CLINIC | Age: 25
End: 2025-04-01
Payer: COMMERCIAL

## 2025-04-01 DIAGNOSIS — H69.92 DISORDER OF LEFT EUSTACHIAN TUBE: Primary | ICD-10-CM

## 2025-04-01 PROCEDURE — 69420 INCISION OF EARDRUM: CPT | Performed by: OTOLARYNGOLOGY

## 2025-04-01 PROCEDURE — 99213 OFFICE O/P EST LOW 20 MIN: CPT | Performed by: OTOLARYNGOLOGY

## 2025-04-01 NOTE — PROGRESS NOTES
"History Of Present Illness    04.01.2025. Still gets some popping in his left ear. Prednisone caused nausea.   He could not use it. Today, he had left IT steroid injection for left ETD.     Plan:  1- follow up in 2 weeks, if the problem persists  _____________________________________________________________________    03.18.2025. He still feels fullness in left ear only, right ear has cleared. No more popping.   Hearing test shows hearing within normal limits. Type A tympanogram at right and type As tympanogram at left ear. Tinnitus off and on, could be on both sides. Dizziness (-).    On examination, bilaterally tympanic membrane's look intact.  Left tympanic membrane has bright effusion scar posterior inferiorly.    My impression, patient has dysfunction of left eustachian tube.    Plan  1-prednisone for 10 days  2-follow-up in 2 weeks  ______________________________________________________________________    02.07.2025: Josue Moon is a 24 y.o. male presenting with: \"Double ear infection in December 2024, despite 2 months he still has symptoms\". Difficulty with hearing, ear fullness and intermittent popping in ears.    He is kindly referred by Mary Boogie PA-C    On examination, TMs look intact bilaterally. I have not seen infection, effusion or retraction. There is increased vascularity of left malleus.    Nasal passages look open bilaterally  No visible postnasal discharge.  Tonsils look hypertrophic bilaterally.  No palpable neck mass  512 cps verduzco (right), rinne +/+ bilaterally.    Plan:  1- hearing test, follow up after the test     Past Medical History  He has a past medical history of Ear problems (Ear Infection 12/10/24).    Surgical History  He has a past surgical history that includes No past surgeries.     Social History  He reports that he quit smoking about 6 years ago. His smoking use included cigarettes. He started smoking about 7 years ago. He has a 0.5 pack-year smoking history. " He uses smokeless tobacco. He reports that he does not currently use alcohol. He reports that he does not use drugs.    Family History  Family History   Problem Relation Name Age of Onset    Sudden death Paternal Grandfather           in sleep ~ age 50s        Allergies  Patient has no known allergies.    Review of Systems (initial ROS)  Difficulty hearing  Ears feel full     Physical Exam (initial exam note)    General appearance: Healthy-appearing, well-nourished, well groomed, in no acute distress.     Head and Face: Atraumatic with no masses, lesions, or scarring.      Salivary glands: No tenderness of the parotid glands or parotid masses.     No tenderness of the submandibular glands or submandibular masses.      Facial strength: Normal strength and symmetry, no synkinesis or facial tic.     Eyes: Conjunctivas look non-hyperemic bilaterally    Ears: Bilaterally ear canals look normal. Tympanic membranes look intact, no hyperemia, fluid or retraction. Hearing grossly normal. Mild old effusion scar (+) at left TM.    Nose: Mucosa looks normal. Focal dryness over nasal septum mucosa anteroinferiorly on both sides. Septum essentially straight.     Oral Cavity/Mouth: Lips and tongue look normal.     Throat: No postnasal discharge. No tonsil hypertrophy. No hyperemia.    Neck: Symmetrical, trachea midline.     Pulmonary: Normal respiratory effort.     Lymphatic: No palpable pathologic lymph nodes at neck.     Neurological/Psychiatric Orientation to person, place, and time: Normal.     Mood and affect: Normal.      Extremities: No clubbing.     Skin: No significant skin lesions were noted at face or neck        Procedure  LEFT NEEDLE MYRINGOTOMY and INTRATYMPANIC STEROID INJECTION 2025  Operative microscope was brought to patient's left ear. Topical phenol was applied posterosuperiorly, then myringotomy was done using 25 g needle and ~0.8 ml 10mg/ml dexamethasone was injected intratympanically. Antibiotic  "drops were applied.    Procedure was concluded.         Last Recorded Vitals  There were no vitals taken for this visit.    Relevant Results    Assessment and Plan:    04.01.2025. Still gets some popping in his left ear. Prednisone caused nausea.   He could not use it. Today, he had left IT steroid injection for left ETD.     Plan:  1- follow up in 2 weeks, if the problem persists  _____________________________________________________________________    03.18.2025. He still feels fullness in left ear only, right ear has cleared. No more popping.   Hearing test shows hearing within normal limits. Type A tympanogram at right and type As tympanogram at left ear. Tinnitus off and on, could be on both sides. Dizziness (-).    On examination, bilaterally tympanic membrane's look intact.  Left tympanic membrane has bright effusion scar posterior inferiorly.    My impression, patient has dysfunction of left eustachian tube.    Plan  1-prednisone for 10 days  2-follow-up in 2 weeks  ______________________________________________________________________    Josue Xavier Moon is a 24 y.o. male presenting with: \"Double ear infection in December 2024, despite 2 months he still has symptoms\". Difficulty with hearing, ear fullness and intermittent popping in ears.    He is kindly referred by Mary Boogie PA-C    On examination, TMs look intact bilaterally. I have not seen infection, effusion or retraction. There is increased vascularity of left malleus.    Nasal passages look open bilaterally  No visible postnasal discharge.  Tonsils look hypertrophic bilaterally.  No palpable neck mass  512 cps verduzco (right), rinne +/+ bilaterally.    My initial impression, patient may have residual eustachian tube dysfunction causing fulness in ears, popping and decrease in hearing.    Plan:  1- hearing test, follow up after the test    Ayana Still  Otolaryngology - Head & Neck Surgery  "

## 2025-04-15 ENCOUNTER — APPOINTMENT (OUTPATIENT)
Dept: OTOLARYNGOLOGY | Facility: CLINIC | Age: 25
End: 2025-04-15
Payer: COMMERCIAL

## 2026-02-26 ENCOUNTER — APPOINTMENT (OUTPATIENT)
Dept: PRIMARY CARE | Facility: CLINIC | Age: 26
End: 2026-02-26
Payer: COMMERCIAL